# Patient Record
Sex: MALE | Race: WHITE | Employment: UNEMPLOYED | URBAN - METROPOLITAN AREA
[De-identification: names, ages, dates, MRNs, and addresses within clinical notes are randomized per-mention and may not be internally consistent; named-entity substitution may affect disease eponyms.]

---

## 2021-07-06 ENCOUNTER — OFFICE VISIT (OUTPATIENT)
Dept: PULMONOLOGY | Age: 66
End: 2021-07-06
Payer: MEDICAID

## 2021-07-06 VITALS
SYSTOLIC BLOOD PRESSURE: 126 MMHG | TEMPERATURE: 98.3 F | HEART RATE: 93 BPM | BODY MASS INDEX: 22.35 KG/M2 | RESPIRATION RATE: 18 BRPM | WEIGHT: 165 LBS | DIASTOLIC BLOOD PRESSURE: 76 MMHG | OXYGEN SATURATION: 96 % | HEIGHT: 72 IN

## 2021-07-06 DIAGNOSIS — G83.9: Primary | ICD-10-CM

## 2021-07-06 DIAGNOSIS — J18.9 PNEUMONIA OF BOTH LOWER LOBES DUE TO INFECTIOUS ORGANISM: ICD-10-CM

## 2021-07-06 DIAGNOSIS — J45.40 MODERATE PERSISTENT EXTRINSIC ASTHMA WITHOUT COMPLICATION: ICD-10-CM

## 2021-07-06 PROBLEM — J44.9 ASTHMA-COPD OVERLAP SYNDROME (HCC): Status: ACTIVE | Noted: 2021-07-06

## 2021-07-06 PROBLEM — R91.1 LUNG NODULE: Status: ACTIVE | Noted: 2021-07-06

## 2021-07-06 PROCEDURE — 99205 OFFICE O/P NEW HI 60 MIN: CPT | Performed by: INTERNAL MEDICINE

## 2021-07-06 RX ORDER — ALBUTEROL SULFATE 90 UG/1
2 AEROSOL, METERED RESPIRATORY (INHALATION)
COMMUNITY
Start: 2021-01-05 | End: 2022-01-05

## 2021-07-06 RX ORDER — LANOLIN ALCOHOL/MO/W.PET/CERES
1000 CREAM (GRAM) TOPICAL DAILY
COMMUNITY
Start: 2020-12-23

## 2021-07-06 RX ORDER — FLUTICASONE PROPIONATE 220 UG/1
2 AEROSOL, METERED RESPIRATORY (INHALATION) 2 TIMES DAILY
COMMUNITY
Start: 2021-01-05

## 2021-07-06 RX ORDER — ALBUTEROL SULFATE 0.83 MG/ML
2.5 SOLUTION RESPIRATORY (INHALATION)
COMMUNITY
Start: 2020-12-11 | End: 2021-12-11

## 2021-07-06 NOTE — LETTER
7/6/2021    Patient: Guillermo To   YOB: 1955   Date of Visit: 7/6/2021     Bree Morales MD  [facility]  [address] [address]  Via Fax: [phone]    Dear Bree Morales MD,      Thank you for referring Mr. Meri Ambrocio to [address] [place] [address] for evaluation. My notes for this consultation are attached. If you have questions, please do not hesitate to call me. I look forward to following your patient along with you.       Sincerely,    Ej Capps MD

## 2021-07-06 NOTE — PROGRESS NOTES
Carmen Tomas presents today for   Chief Complaint   Patient presents with    Shortness of Breath    Cough       Is someone accompanying this pt? No    Is the patient using any DME equipment during OV? No    -DME Company NA    Depression Screening:  3 most recent PHQ Screens 7/6/2021   Little interest or pleasure in doing things Not at all   Feeling down, depressed, irritable, or hopeless Not at all   Total Score PHQ 2 0       Learning Assessment:  Learning Assessment 7/6/2021   PRIMARY LEARNER Patient   PRIMARY LANGUAGE ENGLISH   LEARNER PREFERENCE PRIMARY READING   ANSWERED BY Patient    RELATIONSHIP SELF       Abuse Screening:  No flowsheet data found. Fall Risk  Fall Risk Assessment, last 12 mths 7/6/2021   Able to walk? Yes   Fall in past 12 months? 1   Do you feel unsteady? 1   Are you worried about falling 1         Coordination of Care:   1. Have you been to the ER, urgent care clinic since your last visit? Hospitalized since your last visit? Yes; Name: [facility]   Asthma     2. Have you seen or consulted any other health care providers outside of the [address] [place] [address] since your last visit? Include any pap smears or colon screening.  No

## 2021-07-06 NOTE — PROGRESS NOTES
[facility]  Pulmonary, Critical Care, and Sleep Medicine      Pulmonary Office Initial Consultation    Name: Daron Dunlap     : 1955     Date: 2021        Subjective:   Patient has been referred for evaluation of: Asthma COPD, recurring pneumonia. Patient is a 63 y.o. male is referred by Dr. Shabbir Lincoln for above. Patient states that over the past 2 years he has had several episodes of what he describes as pneumonia with the most recent 1 in May 2021. He gives a history of significant allergic problems growing up-was noted to be allergic to everything that he was tested for and was receiving allergic injections but developed a near anaphylactic reaction and therefore stopped. 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 Later on at one time when he was exposed to cotton dust he had similar problems with breathing. Over the years he has done relatively well-unfortunately has smoked 1 pack of cigarettes per day for 30+ years until he quit 6 years back. More recently as mentioned about 2 years back he noticed seasonal increase in symptoms of nasal congestion, sinus drainage, cough with productive mucus white to yellow in color associated with wheezing and shortness of breath. Typically describes symptoms starting around March and lasting through the spring. Currently he is complaining of increasing symptoms related to humidity in the atmosphere as well. On questioning him about any change in his environment he does mention having moved into a home which has significant humidity. He placed a room dehumidifier which seemed to help his symptoms. As soon as he turned off the dehumidifier his symptoms had returned. He moved into this home due to personal reasons. He does not have any pets at home-no cats, dogs or birds  In the past he has had cats.   In addition to above he does mention symptoms of shortness of breath-he was able to do cycling which is finding it more difficult. Patient specifically denies any dysphagia but does mention a feeling of liquid regurgitating when he sleeps in a certain position. Denies fever chills night sweats or joint pains  Patient sustained cervical spine injury and has paraparesis-able to walk with the help of crutches and support but usually uses wheelchair. He has history of renal cell carcinoma for which he underwent partial nephrectomy  He has been evaluated by [facility] pulmonology-has had PFTs, LDCT and several chest imaging studies-chest x-ray, CTA of chest completed as part of his evaluation as well as during his ER visits. His last ER visit was on 5/25/2021- complains of shortness of breath, cough and wheeze and was treated with antibiotics as well as imaging studies were completed. 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 Significant exposure to organic inhalation dust  Environmental exposures-currently in the humid environment  ILD history:  No Hx of connective tissue disease such as RA, Lupus, Scleroderma  No Hx Raynauds  No Hx sarcoidosis  No Hx taking medications- methotrexate, Amiodarone, Nitrofurantoin  Hx cancer-renal cell carcinoma treated with partial nephrectomy, no chemotherapy, radiation  Possible Hx of exposure to chickens, farm animals, cotton dust, peanut dust  No Hx using hair spray  No Hx asbestos exposure, coal mining, textile industry work, construction work  Hx smoking  No Hx TB/positive PPD  No known Hx covid-19 pneumonia       Review of data:  I have personally reviewed all data-clinical encounters, imaging, outside test results pertinent to patient's care.   Testing:  CXR  CT scan-LDCT  PFT  Echo    Serologies  Vaccinations:  Pneumococcal  Influenza  Covid vaccine-Pfizer 2 doses    DME:    Nebulizer    Past Medical History:   Diagnosis Date    C1-C4 level spinal cord injury without evidence of spinal bone injury, unspecified     Hepatitis C     Malignant neoplasm of kidney, except pelvis     Other specified paralytic syndrome     Renal mass     Shortness of breath     Spinal cord injury 1977       History reviewed. No pertinent surgical history. Social History     Socioeconomic History    Marital status: SINGLE     Spouse name: Not on file    Number of children: Not on file    Years of education: Not on file    Highest education level: Not on file   Tobacco Use    Smoking status: Former Smoker     Packs/day: 1.00     Years: 30.00     Pack years: 30.00     Quit date: 6/8/2011     Years since quitting: 10.0    Smokeless tobacco: Never Used   Substance and Sexual Activity    Alcohol use: Yes     Social Determinants of Health     Financial Resource Strain:     Difficulty of Paying Living Expenses:    Food Insecurity:     Worried About Running Out of Food in the Last Year:     [address] in the Last Year:    Transportation Needs:     Lack of Transportation (Medical):  Lack of Transportation (Non-Medical):    Physical Activity:     Days of Exercise per Week:     Minutes of Exercise per Session:    Stress:     Feeling of Stress :    Social Connections:     Frequency of Communication with Friends and Family:     Frequency of Social Gatherings with Friends and Family:     Attends Tenri[employer] Services:     Active Member of Clubs or Organizations:     Attends Club or Organization Meetings:     Marital Status:        Family History   Problem Relation Age of Onset    Hypertension Mother        No Known Allergies    . Current Outpatient Medications   Medication Sig Dispense Refill    albuterol (PROVENTIL VENTOLIN) 2.5 mg /3 mL (0.083 %) nebu Take 2.5 mg by inhalation every four (4) hours as needed.       albuterol (PROVENTIL HFA, VENTOLIN HFA, PROAIR HFA) 90 mcg/actuation inhaler Take 2 Puffs by inhalation every six (6) hours as needed.  cyanocobalamin 1,000 mcg tablet Take 1,000 mcg by mouth daily.  fluticasone propionate (FLOVENT HFA) 220 mcg/actuation inhaler Take 2 Puffs by inhalation two (2) times a day.  tiotropium (Spiriva with HandiHaler) 18 mcg inhalation capsule PLACE 1 CAPSULE INTO INHALER AND INHALE ONCE DAILY      IBUPROFEN (MOTRIN PO) Take  by mouth.  doxylamine succinate (SLEEP AID, DOXYLAMINE,) 25 mg tablet Take 25 mg by mouth nightly as needed for Sleep.  (Patient not taking: Reported on 7/6/2021)         Review of Systems:  HEENT: No epistaxis, ++ seasonal nasal drainage, no difficulty in swallowing, no redness in eyes  Respiratory: as above  Cardiovascular: no chest pain, no palpitations, no chronic leg edema, no syncope  Gastrointestinal: no abd pain, no vomiting, no diarrhea, no bleeding symptoms  Genitourinary: No urinary symptoms or hematuria  Integument/breast: No ulcers or rashes  Musculoskeletal:Neg  Neurological: Bilateral paraparesis- focal weakness, no seizures, no headaches  Behvioral/Psych: No anxiety, no depression  Constitutional: No fever, no chills, no weight loss, no night sweats     Objective:     Visit Vitals  /76 (BP 1 Location: Left upper arm, BP Patient Position: Sitting, BP Cuff Size: Large adult)   Pulse 93   Temp 98.3 °F (36.8 °C) (Oral)   Resp 18   Ht 6' (1.829 m)   Wt 74.8 kg (165 lb)   SpO2 96% Comment: RA Rest   BMI 22.38 kg/m²        Physical Exam:   General: comfortable, no acute distress  HEENT: pupils reactive, sclera anicteric, EOM intact  Neck: No adenopathy or thyroid swelling, no lymphadenopathy or JVD, supple  CVS: S1S2 no murmurs  RS: Mod AE bilaterally, no tactile fremitus or egophony, no accessory muscle use  Abd: soft, non tender, no hepatosplenomegaly  Neuro: non focal, awake, alert  Extrm: no leg edema, clubbing or cyanosis, upper extremity contractures and wasting  Skin: no rash    Data review:   Pertinent labs: CBC, BMP, LFT's    PFT:    Date FVC FEV1  FEV1/FVC [id number] TLC RV RV/TLC VC DLCO   5/15/2020  73/80  53/60  54  27/39  86  168    62/84                                         6 min walk test; not able to perform due to paraparesis      No results found for this or any previous visit. Imaging:  I have personally reviewed the patients radiographs and have reviewed the reports:  XR Results (most recent):  No results found for this or any previous visit. X-ray 5/25/2021 at [address]  The left lower lobe airspace disease in the proper clinical setting could represent pneumonia.  Recommend follow-up imaging in six weeks upon completion of appropriate antibiotic therapy to exclude underlying neoplasm. The follow-up recommendation protocol will be executed to allow for notification of the appropriate health care provider. CT Results (most recent): CTA of chest 5/6/2021  Impression      1. No pulmonary thromboemboli. 2. Patchy airspace opacities within the posterobasilar lower lobes and within the inferior right middle lobe associated with small caliber bronchial opacification, component of the process appears atelectatic, regions of developing pneumonia are not excluded. 3. Cirrhotic surface contour of the liver is suspected. 4. No lymphadenopathy. Mild hilar hugo prominence may be reactive. Signed By: Rock De La Cruz MD on 5/6/2021 12:26 PM    CT of chest 4/20/2021-[place]  Impression    1.  Slight improvement in the appearance of linear nodular focus at the right lung base.  The area has decreased in constant acuity slightly, and is less hazy/inflammatory in appearance than noted on the 12/30/2020 prior study.  I suspect the findings represent sequela of prior infection or inflammation rather than neoplasia.  There is underlying emphysema. Kasie Mettle is no suspicious adenopathy.      2.  The lung findings still deserves follow-up, and I recommend a 6-12 month follow-up CT of the chest.     3.  Cirrhotic liver.  No ascites.  No masses are seen. The follow-up recommendation protocol will be executed to allow for notification of the appropriate health care provider. LDCT chest  1/11/2021  Impression    Stable diffuse emphysematous changes, most prominent in the upper lobes. Linear atelectasis or scarring in the right lung base with some associated hazy ground-glass opacity. There is a more confluent area of nodular pleural-based consolidation in the medial basal segment right lower lobe, with some associated mucous plugging, which is new, measuring 8 x 12 x 22 mm. There are mild hazy ground-glass opacities in the left lower lobe which are similar but increased compared with the prior study. There is some associated bronchiectasis in both lower lobes. Favor postinflammatory/infectious etiology. No other discrete nodules identified. Stable mediastinal and upper abdominal lymph nodes. Cirrhotic appearance of the liver. Small soft tissue nodular density along the posterolateral aspect of the trachea on the left, near the thoracic inlet. This may represent inspissated mucus, but a developing endobronchial lesion is not excluded. Recommend direct visualization with bronchoscopy. Lung-RADS Category: 4A-Suspiciousformalignancy (5-15%)   Recommendation:3 month LDCT follow-up or PET-CT when there is larger or equal to 8 millimeter solid component. Results from Orders Only encounter on 05/30/14    CT CHEST W WO CONT    Narrative  See abd/pelvis ct results are on same order  Joon 7    .      Patient Active Problem List   Diagnosis Code    Hepatitis C B19.20    Injury of cervical spine ([address] S14.109A    Partial bilateral paresis ([address] G83.9    Renal cancer ([address] C64.9    Asthma-COPD overlap syndrome ([address] J44.9    Lung nodule R91.1       IMPRESSION:   · Asthma COPD overlap-with recent frequent exacerbations in a patient who likely has underlying allergic diastasis based on clinical history with further insult related to cigarette smoking over several years. Patient was fairly well controlled until past 2 years with likely triggers causing decompensation related to environmental exposure possibly mold. Given radiologic appearance cannot rule out allergic bronchopulmonary aspergillosis as a possible etiology. In addition presence of fleeting infiltrates raises possibility of noninfectious etiologies like cryptogenic organizing pneumonia. Less likely granulomatous angiitis or vasculitic process. · Bilateral pneumonia/pneumonitis-question recurring aspiration versus about described possibilities of allergic bronchopulmonary aspergillosis, vasculitic process-Wegener's need to be considered in differential diagnosis with history of sinusitis and groundglass opacities bilaterally. Not suspecting primary bronchogenic carcinoma or metastatic renal cell carcinoma. Patient's Covid test was negative however radiologic appearance is concerning of possible Covid which cannot be completely excluded  · History of environmental exposure to organic dust-question chronic hypersensitivity pneumonitis. Less likely since exposure was in remote past  · History of paraparesis s/p cervical spine injury  · Personal history of smoking-30 pack years  · History of renal cell carcinoma      RECOMMENDATIONS:   · Discussed with patient possible differential diagnosis and need for further evaluation.   · Will obtain CBC with differential, IgE level, Aspergillus titers and [facility] allergen panel  · Follow-up chest x-ray to document clearing  · Obtain modified barium swallow to evaluate for possible aspiration  · If imaging abnormalities do not clear may consider doing a bronchoscopy  · Continue with current bronchodilator treatment-Spiriva and Flovent as maintenance therapy with albuterol for rescue  · Will make recommendations regarding further treatment interventions after above work-up is completed  · Environmental control measures-seasonal as well as moving from current location would be beneficial.  Patient has been given instructions on considering minimizing exposure to carpeted surfaces/humid areas and minimize furnishings  · Preventive [id number]-date  · We will follow-up after completion of above work-up and make further recommendations     Health maintenance screens deferred to Primary care provider. Ofe Aj MD    This patient has a high complexity chronic care condition   This Visit needed High complexity medically necessary decision making and management plans. Time spent in preparing for the visit-review of history, tests done prior to arrival, additional time reviewing clinical data, imaging, outside records and test results as well as time spent in ordering tests, treatments and referring patient for further care was a total of 20  minutes. Additional time-counseling with patient and family members regarding care plan 25 minutes.

## 2021-07-15 ENCOUNTER — HOSPITAL ENCOUNTER (OUTPATIENT)
Dept: LAB | Age: 66
Discharge: HOME | End: 2021-07-15

## 2021-07-15 ENCOUNTER — HOSPITAL ENCOUNTER (OUTPATIENT)
Dept: GENERAL RADIOLOGY | Age: 66
Discharge: HOME | End: 2021-07-15
Attending: INTERNAL MEDICINE
Payer: MEDICARE

## 2021-07-15 DIAGNOSIS — J18.9 PNEUMONIA OF BOTH LOWER LOBES DUE TO INFECTIOUS ORGANISM: ICD-10-CM

## 2021-07-15 LAB — XX-LABCORP SPECIMEN COL,LCBCF: NORMAL

## 2021-07-15 PROCEDURE — 74230 X-RAY XM SWLNG FUNCJ C+: CPT

## 2021-07-15 PROCEDURE — 99001 SPECIMEN HANDLING PT-LAB: CPT

## 2021-07-15 PROCEDURE — 74011000250 HC RX REV CODE- 250: Performed by: INTERNAL MEDICINE

## 2021-07-15 PROCEDURE — 92611 MOTION FLUOROSCOPY/SWALLOW: CPT

## 2021-07-15 RX ADMIN — BARIUM SULFATE 700 MG: 700 TABLET ORAL at 13:18

## 2021-07-15 RX ADMIN — BARIUM SULFATE 60 G: 960 POWDER, FOR SUSPENSION ORAL at 13:18

## 2021-07-15 RX ADMIN — BARIUM SULFATE 30 ML: 400 PASTE ORAL at 13:18

## 2021-07-15 NOTE — PROGRESS NOTES
SPEECH LANGUAGE PATHOLOGY   MODIFIED BARIUM SWALLOW STUDY    Patient: Alex Bryant (81 y.o. male)  Date: 7/15/2021  Primary Diagnosis: Pneumonia of both lower lobes due to infectious organism [J18.9]  Precautions: Fall     Prior Level of Swallowing Function/Home Situation: regular/thin liquid diet; reporting globus sensation localized to level of sternum with intake; hx of recurrent PNA     Assessment:  Based on the objective data described below, the patient presents with min pharyngeal dysphagia. Pt tolerating all consistencies presented (thin +/- straw, pudding,regular solids and 13 mm Ba pill with thin liquid wash) with positive airway protection noted across multiple trials. Pt demonstrating adequate oral prep phase with timely swallow initiation, adequate laryngeal elevation/adduction and positive epiglottic inversion. Mildly decreased pharyngeal motility and reduced opening of UES resulting in mild pyriform sinus residuals that were mostly cleared with double swallow. Pt with prominence in posterior pharyngeal wall at the level of C4/C5 consistent with osteophyte that could be contributing to globus sensation; warrants further examination. Pt safe for continuation of regular diet with thin liquids with meds as tolerated. Recommend aspiration precautions and intermittent double swallow to improve globus sensation. Educated on results of MBS with video feedback, diet recs, aspiration risk/precautions and compensatory swallow strategies. Pt able to verbalize understanding.        Recommendations:   Regular diet with thin liquids  Aspiration precautions  HOB >45 during po intake, remain >30 for 30-45 minutes after po   Intermittent double swallow during meal   Oral care TID  Meds per pt preference     SUBJECTIVE:   Patient stated I've been really week since getting pneumonia    OBJECTIVE:     Past Medical History:   Diagnosis Date    C1-C4 level spinal cord injury without evidence of spinal bone injury, unspecified     Hepatitis C     Malignant neoplasm of kidney, except pelvis     Other specified paralytic syndrome     Renal mass     Shortness of breath     Spinal cord injury 1977     Current Diet:  Regular diet with thin liquids   Radiology:  Film Views: Fluoro;Lateral  Patient Position: 90 in chair    Trial 1:   Consistency Presented: Thin liquid;Pudding; Solid (13 mm Ba pill with thin liquid wash )   How Presented: Self-fed/presented;Cup/sip;Spoon;Straw;Successive swallows   Bolus Acceptance: No impairment   Bolus Formation/Control: No impairment:     Propulsion: No impairment   Oral Residue: None   Initiation of Swallow: No impairment   Timing: No impairment   Penetration: None   Aspiration/Timing: No evidence of aspiration   Pharyngeal Clearance: Pyriform residue ; Less than 10%   Attempted Modifications: Double swallow   Effective Modifications: Double swallow   Cues for Modifications: Minimal     Decreased Tongue Base Retraction?: No  Laryngeal Elevation: WFL (within functional limits)  Aspiration/Penetration Score: 1 (No penetration or aspiration-Contrast does not enter the airway)  Pharyngeal Symmetry: Not assessed  Pharyngeal-Esophageal Segment: Decreased relaxation of upper esophageal segment  Pharyngeal Dysfunction: Vertebral calcification;Crico-pharyngeal dysfunction  Oral Phase Severity: No impairment  Pharyngeal Phase Severity: Minimal    8-point Penetration-Aspiration Scale: Score 1    PAIN:  Pt reports 0/10 pain or discomfort prior to MBS. Pt reports 0/10 pain or discomfort post MBS. COMMUNICATION/EDUCATION:   [x]  Education provided post diagnostic testing including oropharyngeal anatomy/physiology, MBS results, diet recommendations and        compensatory strategies/positioning. [x]  Video feedback utilized. []  Handout regarding diet recommendations and thickener instructions provided. [x]  Patient/family have participated as able in goal setting and plan of care.   [] Patient/family agree to work toward stated goals and plan of care. []  Patient understands intent and goals of therapy, but is neutral about his/her participation. []  Patient is unable to participate in goal setting and plan of care.     Thank you for this referral,  Jeremy Strong M.S., [address] [facility]  Speech-Language Pathologist

## 2021-07-19 LAB
A ALTERNATA IGE QN: <0.1 KU/L
A FUMIGATUS IGE QN: <0.1 KU/L
AMER ROACH IGE QN: <0.1 KU/L
BAHIA GRASS IGE QN: 16.9 KU/L
BASOPHILS # BLD AUTO: 0.1 X10E3/UL (ref 0–0.2)
BASOPHILS NFR BLD AUTO: 1 %
BERMUDA GRASS IGE QN: 13.1 KU/L
BOXELDER IGE QN: 0.14 KU/L
C HERBARUM IGE QN: <0.1 KU/L
CAT DANDER IGE QN: <0.1 KU/L
CMN PIGWEED IGE QN: 0.56 KU/L
COMMON RAGWEED IGE QN: 0.57 KU/L
D FARINAE IGE QN: <0.1 KU/L
D PTERONYSS IGE QN: <0.1 KU/L
DOG DANDER IGE QN: <0.1 KU/L
ENGL PLANTAIN IGE QN: 0.41 KU/L
EOSINOPHIL # BLD AUTO: 0.3 X10E3/UL (ref 0–0.4)
EOSINOPHIL NFR BLD AUTO: 4 %
ERYTHROCYTE [DISTWIDTH] IN BLOOD BY AUTOMATED COUNT: 13.4 % (ref 11.6–15.4)
HCT VFR BLD AUTO: 50.4 % (ref 37.5–51)
HGB BLD-MCNC: 17 G/DL (ref 13–17.7)
IGE SERPL-ACNC: 267 IU/ML (ref 6–495)
IMM GRANULOCYTES # BLD AUTO: 0 X10E3/UL (ref 0–0.1)
IMM GRANULOCYTES NFR BLD AUTO: 0 %
JOHNSON GRASS IGE QN: 5.56 KU/L
LONDON PLANE IGE QN: 1.8 KU/L
LYMPHOCYTES # BLD AUTO: 3 X10E3/UL (ref 0.7–3.1)
LYMPHOCYTES NFR BLD AUTO: 36 %
Lab: ABNORMAL
M RACEMOSUS IGE QN: <0.1 KU/L
MCH RBC QN AUTO: 31.6 PG (ref 26.6–33)
MCHC RBC AUTO-ENTMCNC: 33.7 G/DL (ref 31.5–35.7)
MCV RBC AUTO: 94 FL (ref 79–97)
MONOCYTES # BLD AUTO: 0.7 X10E3/UL (ref 0.1–0.9)
MONOCYTES NFR BLD AUTO: 9 %
MT JUNIPER IGE QN: <0.1 KU/L
MUGWORT IGE QN: <0.1 KU/L
NETTLE IGE QN: 0.12 KU/L
NEUTROPHILS # BLD AUTO: 4.1 X10E3/UL (ref 1.4–7)
NEUTROPHILS NFR BLD AUTO: 50 %
P NOTATUM IGE QN: <0.1 KU/L
PLATELET # BLD AUTO: 254 X10E3/UL (ref 150–450)
RBC # BLD AUTO: 5.38 X10E6/UL (ref 4.14–5.8)
S BOTRYOSUM IGE QN: <0.1 KU/L
SHEEP SORREL IGE QN: 0.69 KU/L
SILVER BIRCH IGE QN: 9.12 KU/L
SPECIMEN STATUS REPORT, ROLRST: NORMAL
SWEET GUM IGE QN: 3.35 KU/L
TIMOTHY IGE QN: 25.2 KU/L
WBC # BLD AUTO: 8.3 X10E3/UL (ref 3.4–10.8)
WHITE ELM IGE QN: <0.1 KU/L
WHITE HICKORY IGE QN: 1.05 KU/L
WHITE MULBERRY IGE QN: 0.15 KU/L
WHITE OAK IGE QN: 11.8 KU/L

## 2021-07-26 ENCOUNTER — DOCUMENTATION ONLY (OUTPATIENT)
Dept: PULMONOLOGY | Age: 66
End: 2021-07-26

## 2021-07-26 NOTE — PROGRESS NOTES
Received results of allergen panel  Class IV-V positive reaction to grasses  Class II-III reaction to trees and weeds  IgE 267, eosinophil 4%    Swallowing study-no significant dysphagia    We will discuss results of allergy testing and treatment options with patient at visit

## 2021-09-08 ENCOUNTER — OFFICE VISIT (OUTPATIENT)
Dept: PULMONOLOGY | Age: 66
End: 2021-09-08
Payer: MEDICARE

## 2021-09-08 VITALS
HEART RATE: 83 BPM | RESPIRATION RATE: 16 BRPM | OXYGEN SATURATION: 98 % | HEIGHT: 72 IN | TEMPERATURE: 98.4 F | BODY MASS INDEX: 21.94 KG/M2 | WEIGHT: 162 LBS | SYSTOLIC BLOOD PRESSURE: 135 MMHG | DIASTOLIC BLOOD PRESSURE: 86 MMHG

## 2021-09-08 DIAGNOSIS — J44.9 ASTHMA-COPD OVERLAP SYNDROME (HCC): Primary | ICD-10-CM

## 2021-09-08 DIAGNOSIS — G83.9: ICD-10-CM

## 2021-09-08 DIAGNOSIS — J30.1 SEASONAL ALLERGIC RHINITIS DUE TO POLLEN: ICD-10-CM

## 2021-09-08 DIAGNOSIS — R91.1 LUNG NODULE: ICD-10-CM

## 2021-09-08 PROCEDURE — G8427 DOCREV CUR MEDS BY ELIG CLIN: HCPCS | Performed by: INTERNAL MEDICINE

## 2021-09-08 PROCEDURE — 3017F COLORECTAL CA SCREEN DOC REV: CPT | Performed by: INTERNAL MEDICINE

## 2021-09-08 PROCEDURE — 99214 OFFICE O/P EST MOD 30 MIN: CPT | Performed by: INTERNAL MEDICINE

## 2021-09-08 PROCEDURE — G8432 DEP SCR NOT DOC, RNG: HCPCS | Performed by: INTERNAL MEDICINE

## 2021-09-08 PROCEDURE — G8420 CALC BMI NORM PARAMETERS: HCPCS | Performed by: INTERNAL MEDICINE

## 2021-09-08 RX ORDER — MONTELUKAST SODIUM 10 MG/1
10 TABLET ORAL
COMMUNITY
Start: 2021-06-15

## 2021-09-08 NOTE — LETTER
9/8/2021    Patient: Sergey Gutierres   YOB: 1955   Date of Visit: 9/8/2021     Jenna Honeycutt MD  [facility]  [address] [id number]  Via Fax: [phone]    Dear Jenna Honeycutt MD,      Thank you for referring Mr. Rolly Cheney to [address] [place] [address] for evaluation. My notes for this consultation are attached. If you have questions, please do not hesitate to call me. I look forward to following your patient along with you.       Sincerely,    Papo Christianson MD

## 2021-09-08 NOTE — PATIENT INSTRUCTIONS
Managing Your Allergies: Care Instructions  Your Care Instructions     Managing your allergies is an important part of staying healthy. Your doctor will help you find out what may be the cause of the allergies. Common causes of symptoms are house dust and dust mites, animal dander, mold, and pollen. As soon as you know what triggers your symptoms, try to avoid those things. This can help prevent allergy symptoms, asthma, and other health problems. Ask your doctor about allergy medicine or immunotherapy. These treatments may help reduce or prevent allergy symptoms. Follow-up care is a key part of your treatment and safety. Be sure to make and go to all appointments, and call your doctor if you are having problems. It's also a good idea to know your test results and keep a list of the medicines you take. How can you care for yourself at home? · If you have been told by your doctor that dust or dust mites are causing your allergy, decrease the dust around your bed:  ? Wash sheets, pillowcases, and other bedding in hot water every week. ? Use dust-proof covers for pillows, duvets, and mattresses. Avoid plastic covers because they tear easily and do not \"breathe. \" Wash as instructed on the label. ? Do not use any blankets and pillows that you do not need. ? Use blankets that you can wash in your washing machine. ? Consider removing drapes and carpets, which attract and hold dust, from your bedroom. · If you are allergic to house dust and mites, do not use home humidifiers. Your doctor can suggest ways you can control dust and mites. · Look for signs of cockroaches. Cockroaches cause allergic reactions. Use cockroach baits to get rid of them. Then, clean your home well. Cockroaches like areas where grocery bags, newspapers, empty bottles, or cardboard boxes are stored. Do not keep these inside your home, and keep trash and food containers sealed.  Seal off any spots where cockroaches might enter your home.  · If you are allergic to mold, get rid of furniture, rugs, and drapes that smell musty. Check for mold in the bathroom. · If you are allergic to outdoor pollen or mold spores, use air-conditioning. Change or clean all filters every month. Keep windows closed. · If you are allergic to pollen, stay inside when pollen counts are high. Use a vacuum  with a HEPA filter or a double-thickness filter at least two times each week. · Stay inside when air pollution is bad. Avoid paint fumes, perfumes, and other strong odors. · Avoid conditions that make your allergies worse. Stay away from smoke. Do not smoke or let anyone else smoke in your house. Do not use fireplaces or wood-burning stoves. · If you are allergic to your pets, change the air filter in your furnace every month. Use high-efficiency filters. · If you are allergic to pet dander, keep pets outside or out of your bedroom. Old carpet and cloth furniture can hold a lot of animal dander. You may need to replace them. When should you call for help? Give an epinephrine shot if:    · You think you are having a severe allergic reaction. After giving an epinephrine shot call 911, even if you feel better. Call 911 if:    · You have symptoms of a severe allergic reaction. These may include:  ? Sudden raised, red areas (hives) all over your body. ? Swelling of the throat, mouth, lips, or tongue. ? Trouble breathing. ? Passing out (losing consciousness). Or you may feel very lightheaded or suddenly feel weak, confused, or restless.     · You have been given an epinephrine shot, even if you feel better. Call your doctor now or seek immediate medical care if:    · You have symptoms of an allergic reaction, such as:  ? A rash or hives (raised, red areas on the skin). ? Itching. ? Swelling. ? Belly pain, nausea, or vomiting.    Watch closely for changes in your health, and be sure to contact your doctor if:    · Your allergies get worse.     · You need help controlling your allergies.     · You have questions about allergy testing.     · You do not get better as expected. Where can you learn more? Go to http://www.gray.com/  Enter L249 in the search box to learn more about \"Managing Your Allergies: Care Instructions. \"  Current as of: November 6, 2020               Content Version: 12.8  © 5363-6775 [employer]. Care instructions adapted under license by [employer] (which disclaims liability or warranty for this information). If you have questions about a medical condition or this instruction, always ask your healthcare professional. [place] [address] any warranty or liability for your use of this information. COPD and Asthma: Care Instructions  Your Care Instructions     Some people who have chronic obstructive pulmonary disease (COPD) also have asthma. Both of these problems can damage your lungs. This makes it very important to control them. Asthma causes the airways that lead to the lungs to swell and become narrow. This makes it hard to breathe. You may wheeze or cough. If you have a bad attack, you may need emergency care. There are two parts to treating asthma. · Controlling asthma over the long term. · Treating attacks when they occur. You and your doctor can make an asthma treatment plan that will help. This plan tells you the medicines you take every day to reduce the swelling in your airways and prevent attacks. It also tells you what to do if you have an asthma attack. Follow-up care is a key part of your treatment and safety. Be sure to make and go to all appointments, and call your doctor if you are having problems. It's also a good idea to know your test results and keep a list of the medicines you take. How can you care for yourself at home? To control asthma over the long term  Medicines   Controller medicines reduce swelling in your lungs.  They also prevent asthma attacks. Take your controller medicine exactly as prescribed. Talk to your doctor if you have any problems with your medicine. · Inhaled corticosteroid is a common and effective controller medicine. Using it the right way can prevent or reduce most side effects. · Take your controller medicine every day, not just when you have symptoms. This helps prevent problems before they occur. · Always bring your asthma medicine with you when you travel. · Your doctor may prescribe long-acting medicine that combines a corticosteroid with a beta2-agonist. Follow your doctor's instructions exactly about how to take a long-acting medicine. Examples include:  ? Fluticasone and salmeterol (Advair). ? Budesonide and formoterol (Symbicort). · Do not depend on your controller medicines to stop an asthma attack that has already started. They do not work fast enough to help. · Your doctor may also prescribe anticholinergic inhalers. These include ipratropium (Atrovent) and tiotropium (Spiriva). Education   · Learn what sets off an asthma attack. Avoid these triggers when you can. Common triggers include smoke, air pollution, pollen, animal dander, colds, stress, and cold air. · Do not smoke. Smoking can make COPD and asthma worse. If you need help quitting, talk to your doctor about stop-smoking programs and medicines. These can increase your chances of quitting for good. · Check yourself for symptoms to know which step to follow in your action plan. Watch for things like being short of breath, having chest tightness, coughing, and wheezing. Also notice if symptoms wake you up at night or if you get tired quickly when you exercise. · You may want to learn how to use a peak flow meter. This measures how open your airways are. It may help you know when you will have an asthma attack. To treat attacks when they occur  Use your asthma action plan when you have an attack.  Your quick-relief medicine, such as albuterol, will stop an asthma attack. It relaxes the muscles that get tight around the airways. · Take your quick-relief medicine exactly as prescribed. Talk with your doctor if you have any problems with your medicine. · Keep this medicine with you at all times. · You may need to use this medicine before you exercise. If your doctor prescribed corticosteroid pills to use during an attack, take them as directed. They may take hours to work, but they may shorten the attack and help you breathe better. When should you call for help? Call 911 anytime you think you may need emergency care. For example, call if:    · You have severe trouble breathing. Call your doctor now or seek immediate medical care if:    · You have new or worse shortness of breath.     · You are coughing more deeply or more often, especially if you notice more mucus or a change in the color of your mucus.     · You cough up blood.     · You have new or increased swelling in your legs or belly.     · You have a fever.     · You have used your quick-relief medicine but you are still short of breath. Watch closely for changes in your health, and be sure to contact your doctor if you have any problems. Where can you learn more? Go to http://www.gray.com/  Enter A350 in the search box to learn more about \"COPD and Asthma: Care Instructions. \"  Current as of: October 26, 2020               Content Version: 12.8  © 8320-7630 [employer]. Care instructions adapted under license by [employer] (which disclaims liability or warranty for this information). If you have questions about a medical condition or this instruction, always ask your healthcare professional. [place] [address] any warranty or liability for your use of this information.

## 2021-09-08 NOTE — PROGRESS NOTES
Pro Palmer presents today for   Chief Complaint   Patient presents with    Results     CXR,Labs, Swallow Eval        Is someone accompanying this pt? No    Is the patient using any DME equipment during OV? No    -DME Company NA    Depression Screening:  3 most recent PHQ Screens 7/6/2021   Little interest or pleasure in doing things Not at all   Feeling down, depressed, irritable, or hopeless Not at all   Total Score PHQ 2 0       Learning Assessment:  Learning Assessment 7/6/2021   PRIMARY LEARNER Patient   PRIMARY LANGUAGE ENGLISH   LEARNER PREFERENCE PRIMARY READING   ANSWERED BY Patient    RELATIONSHIP SELF       Abuse Screening:  No flowsheet data found. Fall Risk  Fall Risk Assessment, last 12 mths 7/6/2021   Able to walk? Yes   Fall in past 12 months? 1   Do you feel unsteady? 1   Are you worried about falling 1         Coordination of Care:  1. Have you been to the ER, urgent care clinic since your last visit? Hospitalized since your last visit? No    2. Have you seen or consulted any other health care providers outside of the [address] [place] [address] since your last visit? Include any pap smears or colon screening.  No

## 2021-09-08 NOTE — PROGRESS NOTES
[facility]  Pulmonary, Critical Care, and Sleep Medicine      Pulmonary Office follow up visit    Name: Neel Dean     : 1955     Date: 2021        Subjective:   Patient has been referred for evaluation of: Asthma COPD, recurring pneumonia. 23   Reports significant improvement in his breathing. Continues to use the Spiriva Flovent and Singulair  His sinuses are much clear, he is not coughing as much and feels like he is moving air much better. Since his last visit he completed requested testing-lab work to include CBC, [employer] allergen panel, chest x-ray and a barium swallow and is here to discuss results  Denies any interval visits to the ER  Denies fever chills night sweats    HPI  Patient is a 63 y.o. male is referred by Dr. Marquita Crystal for above. Patient states that over the past 2 years he has had several episodes of what he describes as pneumonia with the most recent 1 in May 2021. He gives a history of significant allergic problems growing up-was noted to be allergic to everything that he was tested for and was receiving allergic injections but developed a near anaphylactic reaction and therefore stopped. 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 Later on at one time when he was exposed to cotton dust he had similar problems with breathing. Over the years he has done relatively well-unfortunately has smoked 1 pack of cigarettes per day for 30+ years until he quit 6 years back. More recently as mentioned about 2 years back he noticed seasonal increase in symptoms of nasal congestion, sinus drainage, cough with productive mucus white to yellow in color associated with wheezing and shortness of breath. Typically describes symptoms starting around March and lasting through the spring.   Currently he is complaining of increasing symptoms related to humidity in the atmosphere as well. On questioning him about any change in his environment he does mention having moved into a home which has significant humidity. He placed a room dehumidifier which seemed to help his symptoms. As soon as he turned off the dehumidifier his symptoms had returned. He moved into this home due to personal reasons. He does not have any pets at home-no cats, dogs or birds  In the past he has had cats. In addition to above he does mention symptoms of shortness of breath-he was able to do cycling which is finding it more difficult. Patient specifically denies any dysphagia but does mention a feeling of liquid regurgitating when he sleeps in a certain position. Denies fever chills night sweats or joint pains  Patient sustained cervical spine injury and has paraparesis-able to walk with the help of crutches and support but usually uses wheelchair. He has history of renal cell carcinoma for which he underwent partial nephrectomy  He has been evaluated by [facility] pulmonology-has had PFTs, LDCT and several chest imaging studies-chest x-ray, CTA of chest completed as part of his evaluation as well as during his ER visits. His last ER visit was on 5/25/2021- complains of shortness of breath, cough and wheeze and was treated with antibiotics as well as imaging studies were completed. 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   Significant exposure to organic inhalation dust  Environmental exposures-currently in the humid environment  ILD history:  No Hx of connective tissue disease such as RA, Lupus, Scleroderma  No Hx Raynauds  No Hx sarcoidosis  No Hx taking medications- methotrexate, Amiodarone, Nitrofurantoin  Hx cancer-renal cell carcinoma treated with partial nephrectomy, no chemotherapy, radiation  Possible Hx of exposure to chickens, farm animals, cotton dust, peanut dust  No Hx using hair spray  No Hx asbestos exposure, coal mining, textile industry work, construction work  Hx smoking  No Hx TB/positive PPD  No known Hx covid-19 pneumonia       Review of data:  I have personally reviewed all data-clinical encounters, imaging, outside test results pertinent to patient's care. Testing:  CXR  CT scan-LDCT  PFT  Echo    Serologies  Vaccinations:  Pneumococcal  Influenza  Covid vaccine-Pfizer 2 doses    DME:    Nebulizer    Past Medical History:   Diagnosis Date    C1-C4 level spinal cord injury without evidence of spinal bone injury, unspecified     Hepatitis C     Malignant neoplasm of kidney, except pelvis     Other specified paralytic syndrome     Renal mass     Shortness of breath     Spinal cord injury 1977     No Known Allergies    Current Outpatient Medications   Medication Sig Dispense Refill    albuterol (PROVENTIL VENTOLIN) 2.5 mg /3 mL (0.083 %) nebu Take 2.5 mg by inhalation every four (4) hours as needed.  albuterol (PROVENTIL HFA, VENTOLIN HFA, PROAIR HFA) 90 mcg/actuation inhaler Take 2 Puffs by inhalation every six (6) hours as needed.  cyanocobalamin 1,000 mcg tablet Take 1,000 mcg by mouth daily.  fluticasone propionate (FLOVENT HFA) 220 mcg/actuation inhaler Take 2 Puffs by inhalation two (2) times a day.  tiotropium (Spiriva with HandiHaler) 18 mcg inhalation capsule PLACE 1 CAPSULE INTO INHALER AND INHALE ONCE DAILY      montelukast (SINGULAIR) 10 mg tablet Take 10 mg by mouth nightly.  doxylamine succinate (SLEEP AID, DOXYLAMINE,) 25 mg tablet Take 25 mg by mouth nightly as needed for Sleep. (Patient not taking: Reported on 7/6/2021)      IBUPROFEN (MOTRIN PO) Take  by mouth.  (Patient not taking: Reported on 9/8/2021)         Review of Systems:  HEENT: No epistaxis, ++ seasonal nasal drainage, no difficulty in swallowing, no redness in eyes  Respiratory: as above  Cardiovascular: no chest pain, no palpitations, no chronic leg edema, no syncope  Gastrointestinal: no abd pain, no vomiting, no diarrhea, no bleeding symptoms  Genitourinary: No urinary symptoms or hematuria  Integument/breast: No ulcers or rashes  Musculoskeletal:Neg  Neurological: Bilateral paraparesis- focal weakness, no seizures, no headaches  Behvioral/Psych: No anxiety, no depression  Constitutional: No fever, no chills, no weight loss, no night sweats     Objective:     Visit Vitals  /86 (BP 1 Location: Left upper arm, BP Patient Position: Sitting, BP Cuff Size: Large adult)   Pulse 83   Temp 98.4 °F (36.9 °C) (Oral)   Resp 16   Ht 6' (1.829 m)   Wt 73.5 kg (162 lb)   SpO2 98% Comment: RA  Rest   BMI 21.97 kg/m²        Physical Exam:   General: comfortable, no acute distress  HEENT: pupils reactive, sclera anicteric, EOM intact  Neck: No adenopathy or thyroid swelling, no lymphadenopathy or JVD, supple  CVS: S1S2 no murmurs  RS: Mod AE bilaterally, no tactile fremitus or egophony, no accessory muscle use  Abd: soft, non tender, no hepatosplenomegaly  Neuro: non focal, awake, alert  Extrm: no leg edema, clubbing or cyanosis, upper extremity contractures and wasting  Skin: no rash    Data review:   Pertinent labs: CBC, BMP, LFT's  . pteronyssinus Class 0 kU/L <0.10    D. farinae Mite Class 0 kU/L <0.10    Cat Hair/Dander Class 0 kU/L <0.10    Dog Hair/Dander Class 0 kU/L <0.10    [place] Grass Class IV kU/L 13. 10Abnormal     Celso grass Class V kU/L 25. 20Abnormal     James Grass Class IV kU/L 5.56Abnormal     B[place]ia Grass Class IV kU/L 16. 90Abnormal     B217-OQM COCKROACH, AMERICAN Class 0 kU/L <0.10    Penicillium notatum Class 0 kU/L <0.10    Cladosporium herbarum Class 0 kU/L <0.10    Aspergillus fumigatus Class 0 kU/L <0.10    Mucor racemosus Class 0 kU/L <0.10    Alternaria tenuis Class 0 kU/L <0.10    Stemphylium botryosum Class 0 kU/L <0.10    K902-DCE COMMON SILVER BIRCH Class IV kU/L 9. 12Abnormal     Rosebud Class IV kU/L 11. [address] Class 0 kU/L <0.10    Maple/[place] Class 0/I kU/L 0.14Abnormal     White Happy Class II kU/L 1.05Abnormal     B492-LSX MAPLE LEAF [place] Class III kU/L 1. 80Abnormal     White [place] Class 0/I kU/L 0.15Abnormal     Sweet Gum Class III kU/L 3.35Abnormal     Mountain [place] Class 0 kU/L <0.10    Ragweed, Short/Common Class II kU/L 0.57Abnormal     Mugwort Class 0 kU/L <0.10    Plantain, English Class I kU/L 0.41Abnormal     Pigweed, Rough Class II kU/L 0.56Abnormal     Sheep Sorrel (Dock) Class II kU/L 0.69Abnormal     Nettle Class 0/I kU/L 0. 12Abnormal              PFT:    Date FVC FEV1  FEV1/FVC [id number] TLC RV RV/TLC VC DLCO   5/15/2020  73/80  53/60  54  27/39  86  168    62/84                                         6 min walk test; not able to perform due to paraparesis      No results found for this or any previous visit. Imaging:  I have personally reviewed the patients radiographs and have reviewed the reports:  XR Results (most recent):  Results from Hospital Encounter encounter on 07/15/21    XR SWALLOW FUNC VIDEO    Narrative  MODIFIED BARIUM SWALLOW. CLINICAL INDICATION/HISTORY: Dysphagia. Feeding difficulties. Globus sensation  in throat. Recurrent pneumonia. COMPARISON: None. TECHNIQUE: A live videoradiographic swallowing function study was performed in  conjunction with the speech therapist.  The video is available in the department  for review by speech pathology and ancillary staff. Fluoroscopic images were not made available in PACS for radiologist review and  this report is strictly a procedural documentation by the physician assistant  with input from speech pathology. It is not considered inclusive or exclusive  of anatomic abnormalities and is not diagnostic beyond the specific  considerations regarding swallowing function as it relates to airway protection  while eating and drinking.     Fluoroscopy time: 48 seconds    Fluoroscopic images: 0    Electronic capture cine loops: 6    FINDINGS:    Patient swallowed multiple consistencies of barium mixtures including thin  liquids, pudding, solids and barium tablet challenge. There was no alicia penetration or aspiration with all tested consistencies. Pyriform residuals were seen with all tested consistencies. Impression  No alicia penetration or aspiration with all tested consistencies. Please see speech pathologist report for additional details and recommendations. Chest x-ray [place]-7/12/2021  Mild bronchiectasis and residual linear opacities in the left lower lobe which may reflect atelectasis and/or scarring.  The appearance is mildly improved. [facility] 5/25/2021 at [facility]  The left lower lobe airspace disease in the proper clinical setting could represent pneumonia.  Recommend follow-up imaging in six weeks upon completion of appropriate antibiotic therapy to exclude underlying neoplasm. The follow-up recommendation protocol will be executed to allow for notification of the appropriate health care provider. CT Results (most recent): CTA of chest 5/6/2021  Impression      1. No pulmonary thromboemboli. 2. Patchy airspace opacities within the posterobasilar lower lobes and within the inferior right middle lobe associated with small caliber bronchial opacification, component of the process appears atelectatic, regions of developing pneumonia are not excluded. 3. Cirrhotic surface contour of the liver is suspected. 4. No lymphadenopathy. Mild hilar hugo prominence may be reactive.      Signed By: Allen Pressley MD on 5/6/2021 12:26 PM      CT of chest 4/20/2021-[place]  Impression    1.  Slight improvement in the appearance of linear nodular focus at the right lung base.  The area has decreased in constant acuity slightly, and is less hazy/inflammatory in appearance than noted on the 12/30/2020 prior study.  I suspect the findings represent sequela of prior infection or inflammation rather than neoplasia. [place] is underlying emphysema. [place] is no suspicious adenopathy. 2.  The lung findings still deserves follow-up, and I recommend a 6-12 month follow-up CT of the chest.     3.  Cirrhotic liver.  No ascites.  No masses are seen. The follow-up recommendation protocol will be executed to allow for notification of the appropriate health care provider. LDCT chest  1/11/2021  Impression    Stable diffuse emphysematous changes, most prominent in the upper lobes. Linear atelectasis or scarring in the right lung base with some associated hazy ground-glass opacity. There is a more confluent area of nodular pleural-based consolidation in the medial basal segment right lower lobe, with some associated mucous plugging, which is new, measuring 8 x 12 x 22 mm. There are mild hazy ground-glass opacities in the left lower lobe which are similar but increased compared with the prior study. There is some associated bronchiectasis in both lower lobes. Favor postinflammatory/infectious etiology. No other discrete nodules identified. Stable mediastinal and upper abdominal lymph nodes. Cirrhotic appearance of the liver. Small soft tissue nodular density along the posterolateral aspect of the trachea on the left, near the thoracic inlet. This may represent inspissated mucus, but a developing endobronchial lesion is not excluded. Recommend direct visualization with bronchoscopy. Lung-RADS Category: 4A-Suspiciousformalignancy (5-15%)   Recommendation:3 month LDCT follow-up or PET-CT when there is larger or equal to 8 millimeter solid component. Results from Orders Only encounter on 05/30/14    CT CHEST W WO CONT    Narrative  See abd/pelvis ct results are on same order  Joon 7    .      Patient Active Problem List   Diagnosis Code    Hepatitis C B19.20    Injury of cervical spine ([address] S14.109A    Partial bilateral paresis ([address] G83.9    Renal cancer ([address] C64.9  Asthma-COPD overlap syndrome (HCC) J44.9    Lung nodule R91.1       IMPRESSION:   · Asthma COPD overlap-with recent frequent exacerbations in a patient who likely has underlying allergic diastasis based on clinical history with further insult related to cigarette smoking over several years. Patient was fairly well controlled until past 2 years with likely triggers causing decompensation related to environmental exposure possibly mold. Given radiologic appearance cannot rule out allergic bronchopulmonary aspergillosis as a possible etiology. [facility] allergy panel shows class IV to class V positive reactions to grasses and trees. Follow-up imaging shows clearing therefore less likely to be ABPA  · Bilateral pneumonia/pneumonitis-in retrospect likely had Covid . Follow-up chest x-ray done at [facility] shows bronchiectasis with some linear fibrotic atelectasis but no further groundglass opacities. Barium swallow did not show any significant aspiration  · History of environmental exposure to organic dust-question chronic hypersensitivity pneumonitis.   Less likely since exposure was in remote past  · History of paraparesis s/p cervical spine injury  · Personal history of smoking-30 pack years  · History of renal cell carcinoma      RECOMMENDATIONS:   · Discussed with patient findings on chest x-ray, labs and barium swallow  · Will obtain CBC with differential, IgE level-normal.  [facility] allergen panel with class IV 5 reaction to grasses and trees  · Follow-up chest x-ray completed to document clearing  · No evidence of aspiration on barium swallow  · Continue with current bronchodilator treatment-Spiriva and Flovent as maintenance therapy with albuterol for rescue  · Will make recommendations regarding further treatment interventions after above work-up is completed  · Environmental control measures-seasonal as well as moving from current location would be beneficial.  Patient has been given instructions on considering minimizing exposure to carpeted surfaces/humid areas and minimize furnishings  · Preventive hijtbgibzbdi-sk-sn-date. Will need Covid booster and influenza vaccination  · We will follow-up in 3 months     Health maintenance screens deferred to Primary care provider. Parish Riddle MD    This patient has a high complexity chronic care condition   This Visit needed High complexity medically necessary decision making and management plans.